# Patient Record
Sex: MALE | Race: WHITE | NOT HISPANIC OR LATINO | Employment: OTHER | URBAN - METROPOLITAN AREA
[De-identification: names, ages, dates, MRNs, and addresses within clinical notes are randomized per-mention and may not be internally consistent; named-entity substitution may affect disease eponyms.]

---

## 2017-03-16 PROBLEM — J33.9 SINUSITIS WITH NASAL POLYPS: Status: ACTIVE | Noted: 2017-03-16

## 2017-03-16 PROBLEM — J32.9 SINUSITIS WITH NASAL POLYPS: Status: ACTIVE | Noted: 2017-03-16

## 2017-09-01 ENCOUNTER — OFFICE VISIT (OUTPATIENT)
Dept: URGENT CARE | Facility: CLINIC | Age: 72
End: 2017-09-01
Payer: MEDICARE

## 2017-09-01 VITALS
RESPIRATION RATE: 19 BRPM | HEART RATE: 92 BPM | WEIGHT: 185 LBS | SYSTOLIC BLOOD PRESSURE: 143 MMHG | DIASTOLIC BLOOD PRESSURE: 83 MMHG | BODY MASS INDEX: 28.04 KG/M2 | OXYGEN SATURATION: 97 % | HEIGHT: 68 IN | TEMPERATURE: 99 F

## 2017-09-01 DIAGNOSIS — J40 BRONCHITIS: Primary | ICD-10-CM

## 2017-09-01 PROCEDURE — 1159F MED LIST DOCD IN RCRD: CPT | Mod: S$GLB,,, | Performed by: EMERGENCY MEDICINE

## 2017-09-01 PROCEDURE — 3008F BODY MASS INDEX DOCD: CPT | Mod: S$GLB,,, | Performed by: EMERGENCY MEDICINE

## 2017-09-01 PROCEDURE — 3079F DIAST BP 80-89 MM HG: CPT | Mod: S$GLB,,, | Performed by: EMERGENCY MEDICINE

## 2017-09-01 PROCEDURE — 1126F AMNT PAIN NOTED NONE PRSNT: CPT | Mod: S$GLB,,, | Performed by: EMERGENCY MEDICINE

## 2017-09-01 PROCEDURE — 99203 OFFICE O/P NEW LOW 30 MIN: CPT | Mod: S$GLB,,, | Performed by: EMERGENCY MEDICINE

## 2017-09-01 PROCEDURE — 3077F SYST BP >= 140 MM HG: CPT | Mod: S$GLB,,, | Performed by: EMERGENCY MEDICINE

## 2017-09-01 RX ORDER — DOXYCYCLINE 100 MG/1
100 CAPSULE ORAL 2 TIMES DAILY
Qty: 20 CAPSULE | Refills: 0 | Status: SHIPPED | OUTPATIENT
Start: 2017-09-01 | End: 2017-09-27

## 2017-09-01 NOTE — PATIENT INSTRUCTIONS
Bronchitis, Antibiotic Treatment (Adult)    Bronchitis is an infection of the air passages (bronchial tubes) in your lungs. It often occurs when you have a cold. This illness is contagious during the first few days and is spread through the air by coughing and sneezing, or by direct contact (touching the sick person and then touching your own eyes, nose, or mouth).  Symptoms of bronchitis include cough with mucus (phlegm) and low-grade fever. Bronchitis usually lasts 7 to 14 days. Mild cases can be treated with simple home remedies. More severe infection is treated with an antibiotic.  Home care  Follow these guidelines when caring for yourself at home:  · If your symptoms are severe, rest at home for the first 2 to 3 days. When you go back to your usual activities, don't let yourself get too tired.  · Do not smoke. Also avoid being exposed to secondhand smoke.  · You may use over-the-counter medicines to control fever or pain, unless another medicine was prescribed. (Note: If you have chronic liver or kidney disease or have ever had a stomach ulcer or gastrointestinal bleeding, talk with your healthcare provider before using these medicines. Also talk to your provider if you are taking medicine to prevent blood clots.) Aspirin should never be given to anyone younger than 18 years of age who is ill with a viral infection or fever. It may cause severe liver or brain damage.  · Your appetite may be poor, so a light diet is fine. Avoid dehydration by drinking 6 to 8 glasses of fluids per day (such as water, soft drinks, sports drinks, juices, tea, or soup). Extra fluids will help loosen secretions in the nose and lungs.  · Over-the-counter cough, cold, and sore-throat medicines will not shorten the length of the illness, but they may be helpful to reduce symptoms. (Note: Do not use decongestants if you have high blood pressure.)  · Finish all antibiotic medicine. Do this even if you are feeling better after only a  few days.  Follow-up care  Follow up with your healthcare provider, or as advised. If you had an X-ray or ECG (electrocardiogram), a specialist will review it. You will be notified of any new findings that may affect your care.  Note: If you are age 65 or older, or if you have a chronic lung disease or condition that affects your immune system, or you smoke, talk to your healthcare provider about having pneumococcal vaccinations and a yearly influenza vaccination (flu shot).  When to seek medical advice  Call your healthcare provider right away if any of these occur:  · Fever of 100.4°F (38°C) or higher  · Coughing up increased amounts of colored sputum  · Weakness, drowsiness, headache, facial pain, ear pain, or a stiff neck  Call 911, or get immediate medical care  Contact emergency services right away if any of these occur.  · Coughing up blood  · Worsening weakness, drowsiness, headache, or stiff neck  · Trouble breathing, wheezing, or pain with breathing  Date Last Reviewed: 9/13/2015 © 2000-2016 [employer]. [address] [place] [address], [place] [address]. All rights reserved. This information is not intended as a substitute for professional medical care. Always follow your healthcare professional's instructions.

## 2017-09-01 NOTE — PROGRESS NOTES
"Subjective:       Patient ID: Yoshi Mulligan is a 71 y.o. male.    Vitals:  height is 5' 8" (1.727 m) and weight is 83.9 kg (185 lb). His oral temperature is 99.3 °F (37.4 °C). His blood pressure is 143/83 (abnormal) and his pulse is 92. His respiration is 19 and oxygen saturation is 97%.     Chief Complaint: Cough    Pt with complicated medical problems including TIAs, atrial fib on anticoagulants and COPD. He was seen this week by the NP for Dr. Zavala and is scheduled to have many tests done and records from his other  Specialists. He is here today because he has chest congestion, low grade fever and dry cough. His leg swelling is better. He stopped smoking 4 yrs ago. He does have a sore throat too.    Discussed meds with pt. He can take doxy. He knows not to take decongestants due to his other medical problems. He does have an inhaler      Cough   This is a new problem. The current episode started in the past 7 days. The problem has been gradually worsening. The problem occurs constantly. The cough is non-productive. Associated symptoms include nasal congestion and a sore throat. Pertinent negatives include no chest pain, chills, ear pain, eye redness, fever, headaches, myalgias, postnasal drip, shortness of breath or wheezing. The symptoms are aggravated by lying down. Treatments tried: gaggle. The treatment provided mild relief. His past medical history is significant for bronchitis, COPD and emphysema. There is no history of asthma or pneumonia.     Review of Systems   Constitution: Negative for chills, fever and malaise/fatigue.   HENT: Positive for congestion and sore throat. Negative for ear pain, hoarse voice and postnasal drip.    Eyes: Negative for discharge and redness.   Cardiovascular: Negative for chest pain, dyspnea on exertion and leg swelling.   Respiratory: Positive for cough. Negative for shortness of breath, sputum production and wheezing.    Musculoskeletal: Negative for myalgias. "   Gastrointestinal: Negative for abdominal pain and nausea.   Neurological: Negative for headaches.       Objective:      Physical Exam   Constitutional: He is oriented to person, place, and time. He appears well-developed and well-nourished. He is cooperative.  Non-toxic appearance. He does not appear ill. He appears distressed (mild discomfort, obese and COPD habitus).   HENT:   Head: Normocephalic and atraumatic.   Right Ear: Hearing, tympanic membrane, external ear and ear canal normal.   Left Ear: Hearing, tympanic membrane, external ear and ear canal normal.   Nose: Nose normal. No mucosal edema, rhinorrhea or nasal deformity. No epistaxis. Right sinus exhibits no maxillary sinus tenderness and no frontal sinus tenderness. Left sinus exhibits no maxillary sinus tenderness and no frontal sinus tenderness.   Mouth/Throat: Uvula is midline and mucous membranes are normal. No trismus in the jaw. Normal dentition. No uvula swelling. Posterior oropharyngeal erythema present.   Eyes: Conjunctivae and lids are normal. No scleral icterus.   Sclera clear bilat   Neck: Trachea normal, full passive range of motion without pain and phonation normal. Neck supple.   Cardiovascular: Normal rate, intact distal pulses and normal pulses.  An irregularly irregular rhythm present.   Murmur heard.  Pulmonary/Chest: Effort normal. No respiratory distress. He has decreased breath sounds in the right lower field and the left lower field. He has wheezes in the left middle field and the left lower field. He has rhonchi in the left middle field and the left lower field.   Abdominal: Soft. Normal appearance and bowel sounds are normal. He exhibits no distension. There is no tenderness.   Musculoskeletal: Normal range of motion. He exhibits no edema or deformity.   Neurological: He is alert and oriented to person, place, and time. He exhibits normal muscle tone. Coordination normal.   Skin: Skin is warm, dry and intact. He is not  diaphoretic. No pallor.   Psychiatric: He has a normal mood and affect. His speech is normal and behavior is normal. Judgment and thought content normal. Cognition and memory are normal.   Nursing note and vitals reviewed.    CXR shows cardiomegaly and probable fat pad right side adjacent to diaphragm Increased interstitial markings but no consolidation Radiologist report confirms and pt informed  Assessment:       1. Bronchitis        Plan:         Bronchitis  -     X-Ray Chest PA And Lateral; Future; Expected date: 09/01/2017    Other orders  -     doxycycline (MONODOX) 100 MG capsule; Take 1 capsule (100 mg total) by mouth 2 (two) times daily.  Dispense: 20 capsule; Refill: 0

## 2017-09-27 ENCOUNTER — OFFICE VISIT (OUTPATIENT)
Dept: URGENT CARE | Facility: CLINIC | Age: 72
End: 2017-09-27
Payer: MEDICARE

## 2017-09-27 VITALS
OXYGEN SATURATION: 93 % | HEIGHT: 69 IN | BODY MASS INDEX: 27.4 KG/M2 | SYSTOLIC BLOOD PRESSURE: 198 MMHG | WEIGHT: 185 LBS | RESPIRATION RATE: 19 BRPM | TEMPERATURE: 99 F | HEART RATE: 132 BPM | DIASTOLIC BLOOD PRESSURE: 94 MMHG

## 2017-09-27 DIAGNOSIS — R00.0 TACHYCARDIA: ICD-10-CM

## 2017-09-27 DIAGNOSIS — R06.02 SHORTNESS OF BREATH: ICD-10-CM

## 2017-09-27 DIAGNOSIS — J44.1 COPD WITH ACUTE EXACERBATION: Primary | ICD-10-CM

## 2017-09-27 PROCEDURE — 3080F DIAST BP >= 90 MM HG: CPT | Mod: S$GLB,,, | Performed by: EMERGENCY MEDICINE

## 2017-09-27 PROCEDURE — 3008F BODY MASS INDEX DOCD: CPT | Mod: S$GLB,,, | Performed by: EMERGENCY MEDICINE

## 2017-09-27 PROCEDURE — 99214 OFFICE O/P EST MOD 30 MIN: CPT | Mod: S$GLB,,, | Performed by: EMERGENCY MEDICINE

## 2017-09-27 PROCEDURE — 1126F AMNT PAIN NOTED NONE PRSNT: CPT | Mod: S$GLB,,, | Performed by: EMERGENCY MEDICINE

## 2017-09-27 PROCEDURE — 1159F MED LIST DOCD IN RCRD: CPT | Mod: S$GLB,,, | Performed by: EMERGENCY MEDICINE

## 2017-09-27 PROCEDURE — 3077F SYST BP >= 140 MM HG: CPT | Mod: S$GLB,,, | Performed by: EMERGENCY MEDICINE

## 2017-09-27 NOTE — PATIENT INSTRUCTIONS
Go directly to the ER at [facility] now to be evaluated for your shortness of breath

## 2017-09-27 NOTE — PROGRESS NOTES
"Subjective:       Patient ID: Yoshi Mulligan is a 71 y.o. male.    Vitals:  height is 5' 9" (1.753 m) and weight is 83.9 kg (185 lb). His oral temperature is 99.1 °F (37.3 °C). His blood pressure is 198/94 (abnormal) and his pulse is 132 (abnormal). His respiration is 19 and oxygen saturation is 93% (abnormal).     Chief Complaint: Shortness of Breath    Patient with shortness of breath x 3 days. Patient with hx of asthma. Patient stating he was seen here on 09/01/17 and was given doxycycline. Medication not helpful so patient's pcp placed him on Ceftin and placed him on a Medrol dose pack. Patient reports improvement with those medications. On Sunday, patient stating he became short of breath. He went to pulmonary clinic rehab this am and got worse. His pcp Dr. Zavala the cardiologist sent him here instructed him to come here for a breathing treatment and a chest xray CBC, CMP and BNP.  Patient stating he has a hx of COPD. His normal 02 sat level is 96%. Pt's own 02 sat machine shows 96% right now.  Discussed with pt. I could get some of his tests but basically he needs more than we can get done here and CXR would just be repeated. He is a pt of Dr. Zavala at [facility] and I spoke with Dr. Christian Franz and notified him pt coming POV.      Cough   This is a new problem. The current episode started in the past 7 days. The problem has been unchanged. The problem occurs every few minutes. Associated symptoms include shortness of breath and wheezing. Pertinent negatives include no chest pain, chills, ear pain, eye redness, fever, headaches, myalgias, rash or sore throat. The symptoms are aggravated by lying down (exertion). He has tried steroid inhaler for the symptoms. The treatment provided mild relief. His past medical history is significant for asthma and COPD.     Review of Systems   Constitution: Positive for malaise/fatigue. Negative for chills and fever.   HENT: Positive for hoarse voice. Negative for " congestion, ear pain and sore throat.    Eyes: Negative for discharge and redness.   Cardiovascular: Positive for dyspnea on exertion. Negative for chest pain and leg swelling.   Respiratory: Positive for cough, shortness of breath and wheezing. Negative for sputum production.    Skin: Negative for rash.   Musculoskeletal: Negative for myalgias.   Gastrointestinal: Negative for abdominal pain and nausea.   Genitourinary: Negative for dysuria.   Neurological: Negative for headaches.   Psychiatric/Behavioral: Negative for altered mental status.       Objective:      Physical Exam   Constitutional: He is oriented to person, place, and time. He appears well-developed and well-nourished. He is cooperative.  Non-toxic appearance. He does not appear ill. He appears distressed (moderate resp distress).   HENT:   Head: Normocephalic and atraumatic.   Right Ear: Hearing, tympanic membrane, external ear and ear canal normal.   Left Ear: Hearing, tympanic membrane, external ear and ear canal normal.   Nose: Nose normal. No mucosal edema, rhinorrhea or nasal deformity. No epistaxis. Right sinus exhibits no maxillary sinus tenderness and no frontal sinus tenderness. Left sinus exhibits no maxillary sinus tenderness and no frontal sinus tenderness.   Mouth/Throat: Uvula is midline, oropharynx is clear and moist and mucous membranes are normal. No trismus in the jaw. Normal dentition. No uvula swelling. No posterior oropharyngeal erythema.   Eyes: Conjunctivae and lids are normal. Right eye exhibits no discharge. Left eye exhibits no discharge. No scleral icterus.   Sclera clear bilat   Neck: Trachea normal, normal range of motion, full passive range of motion without pain and phonation normal. Neck supple.   Cardiovascular: Regular rhythm, normal heart sounds and normal pulses.  Tachycardia present.    1+ pretibial edema bilaterally   Pulmonary/Chest: Tachypnea noted. He is in respiratory distress (pt talking in full sentences and  walks fine). He has decreased breath sounds.   Decrease BS diffusely. No rales or wheezes heard   Abdominal: Soft. Normal appearance and bowel sounds are normal. He exhibits no distension, no pulsatile midline mass and no mass. There is no tenderness.   Musculoskeletal: Normal range of motion. He exhibits edema. He exhibits no deformity.   Neurological: He is alert and oriented to person, place, and time. He exhibits normal muscle tone. Coordination normal.   Skin: Skin is warm, dry and intact. He is not diaphoretic. No pallor.   Psychiatric: He has a normal mood and affect. His speech is normal and behavior is normal. Judgment and thought content normal. Cognition and memory are normal.   Nursing note and vitals reviewed.      Assessment:       1. COPD with acute exacerbation    2. Tachycardia    3. Shortness of breath        Plan:         COPD with acute exacerbation    Tachycardia    Shortness of breath

## 2017-10-30 ENCOUNTER — OFFICE VISIT (OUTPATIENT)
Dept: URGENT CARE | Facility: CLINIC | Age: 72
End: 2017-10-30
Payer: MEDICARE

## 2017-10-30 VITALS
DIASTOLIC BLOOD PRESSURE: 84 MMHG | SYSTOLIC BLOOD PRESSURE: 142 MMHG | HEART RATE: 91 BPM | RESPIRATION RATE: 19 BRPM | OXYGEN SATURATION: 95 % | TEMPERATURE: 99 F | HEIGHT: 70 IN | WEIGHT: 185 LBS | BODY MASS INDEX: 26.48 KG/M2

## 2017-10-30 DIAGNOSIS — S92.502A FRACTURE OF FIFTH TOE, LEFT, CLOSED, INITIAL ENCOUNTER: Primary | ICD-10-CM

## 2017-10-30 DIAGNOSIS — S90.121A CONTUSION OF FIFTH TOE OF RIGHT FOOT, INITIAL ENCOUNTER: ICD-10-CM

## 2017-10-30 DIAGNOSIS — M79.671 PAIN IN RIGHT FOOT: ICD-10-CM

## 2017-10-30 PROCEDURE — 99203 OFFICE O/P NEW LOW 30 MIN: CPT | Mod: S$GLB,,, | Performed by: NURSE PRACTITIONER

## 2017-10-30 RX ORDER — METOPROLOL SUCCINATE 100 MG/1
100 TABLET, EXTENDED RELEASE ORAL DAILY
COMMUNITY
End: 2018-01-30

## 2017-10-30 NOTE — PROGRESS NOTES
"Subjective:       Patient ID: Yosih Mulligan is a 72 y.o. male.    Vitals:  height is 5' 10" (1.778 m) and weight is 83.9 kg (185 lb). His oral temperature is 98.8 °F (37.1 °C). His blood pressure is 142/84 (abnormal) and his pulse is 91. His respiration is 19 and oxygen saturation is 95%.     Chief Complaint: Foot Injury    Patient with rt foot pain x 2 days. Patient stating he woke up in the middle of the night on Saturday and he remembers falling. Patient stating he woke up on the ground. He presents with rt foot pain in addition to rt tib/fib pain. Patient stating he does not think he hit his head. Patient without neck or back pain.       Foot Injury    The incident occurred 2 days ago. The incident occurred at home. The injury mechanism was a fall. The pain is present in the right foot. The quality of the pain is described as aching. The pain is at a severity of 7/10. The pain is moderate. Pertinent negatives include no inability to bear weight or numbness. He reports no foreign bodies present. The symptoms are aggravated by movement. He has tried nothing for the symptoms.     Review of Systems   Constitution: Negative for weakness and malaise/fatigue.   HENT: Negative for nosebleeds.    Cardiovascular: Negative for chest pain and syncope.   Respiratory: Negative for shortness of breath.    Musculoskeletal: Positive for joint pain and joint swelling. Negative for back pain and neck pain.   Gastrointestinal: Negative for abdominal pain.   Genitourinary: Negative for hematuria.   Neurological: Negative for dizziness and numbness.       Objective:      Physical Exam   Constitutional: He is oriented to person, place, and time. He appears well-developed and well-nourished. He is cooperative.  Non-toxic appearance. He does not appear ill. No distress.   HENT:   Head: Normocephalic and atraumatic. Head is without abrasion, without contusion and without laceration.   Right Ear: Hearing, tympanic membrane, " external ear and ear canal normal. No hemotympanum.   Left Ear: Hearing, tympanic membrane, external ear and ear canal normal. No hemotympanum.   Nose: Nose normal. No mucosal edema, rhinorrhea or nasal deformity. No epistaxis. Right sinus exhibits no maxillary sinus tenderness and no frontal sinus tenderness. Left sinus exhibits no maxillary sinus tenderness and no frontal sinus tenderness.   Mouth/Throat: Uvula is midline, oropharynx is clear and moist and mucous membranes are normal. No trismus in the jaw. Normal dentition. No uvula swelling. No posterior oropharyngeal erythema.   Eyes: Conjunctivae, EOM and lids are normal. Pupils are equal, round, and reactive to light. Right eye exhibits no discharge. Left eye exhibits no discharge. No scleral icterus.   Sclera clear bilat   Neck: Trachea normal, normal range of motion, full passive range of motion without pain and phonation normal. Neck supple. No spinous process tenderness and no muscular tenderness present. No neck rigidity. No tracheal deviation present.   Cardiovascular: Normal rate, regular rhythm, normal heart sounds, intact distal pulses and normal pulses.    Pulmonary/Chest: Effort normal and breath sounds normal. No respiratory distress.   Abdominal: Soft. Normal appearance and bowel sounds are normal. He exhibits no distension, no pulsatile midline mass and no mass. There is no tenderness.   Musculoskeletal: Normal range of motion. He exhibits no edema or deformity.        Right foot: There is bony tenderness (5TH TOE) and swelling (AND ECCHYMOSIS).        Feet:    Neurological: He is alert and oriented to person, place, and time. He has normal strength. No cranial nerve deficit or sensory deficit. He exhibits normal muscle tone. He displays no seizure activity. Coordination normal. GCS eye subscore is 4. GCS verbal subscore is 5. GCS motor subscore is 6.   Skin: Skin is warm, dry and intact. Capillary refill takes less than 2 seconds. No abrasion,  no bruising, no burn, no ecchymosis and no laceration noted. He is not diaphoretic. No pallor.   Psychiatric: He has a normal mood and affect. His speech is normal and behavior is normal. Judgment and thought content normal. Cognition and memory are normal.   Nursing note and vitals reviewed.      X-Ray Foot Complete 3 view Right 10/30/2017 None Specified          RESULTS:  Comparison: None.     Findings: AP, lateral and oblique radiographs of the right foot as well as dedicated images of the right fifth ray demonstrate a comminuted fracture of the proximal metaphysis of the fifth proximal phalanx.  Minimal angulation identified.  No intra-articular extension.  Surrounding soft tissue swelling identified.  Remaining osseous structures, soft tissues and joint spaces are within normal limits.  IMPRESSION:    Comminuted fracture of the fifth proximal phalanx        Electronically signed by: CARYN SCHWARTZ MD  Date:                                            10/30/17  Time:                                           11:44            Assessment:       1. Fracture of fifth toe, left, closed, initial encounter    2. Contusion of fifth toe of right foot, initial encounter    3. Pain in right foot        Plan:         Fracture of fifth toe, left, closed, initial encounter    Contusion of fifth toe of right foot, initial encounter  -     X-Ray Toe 2 or More Views Right; Future; Expected date: 10/30/2017    Pain in right foot  -     X-Ray Foot Complete 3 view Right; Future; Expected date: 10/30/2017    4TH AND 5TH TOES RAGHAV TAPED    Please drink plenty of fluids.  Please get plenty of rest.  Please return here or go to the Emergency Department for any concerns or worsening of condition.  If you were prescribed a narcotic medication, do not drive or operate heavy equipment or machinery while taking these medications.  If you were not prescribed an anti-inflammatory medication, and if you do not have any history of  stomach/intestinal ulcers, or kidney disease, or are not taking a blood thinner such as Coumadin, Plavix, Pradaxa, Eloquis, or Xaralta for example, it is OK to take over the counter Ibuprofen or Advil or Motrin or Aleve as directed.  Do not take these medications on an empty stomach.  Rest, ice, compression and elevation to the affected joint or limb as needed.  Please follow up with your primary care doctor or specialist as needed.    If you  smoke, please stop smoking.

## 2017-10-30 NOTE — PATIENT INSTRUCTIONS
Closed Toe Fracture  Your toe is broken (fractured). This causes local pain, swelling, and sometimes bruising. This injury usually takes about 4 to 6 weeks to heal, but can sometimes take longer. Toe injuries are often treated by taping the injured toe to the next one (buddy taping). This protects the injured toe and holds it in position.     If the toenail has been severely injured, it may fall off in 1 to 2 weeks. It takes up to 12 months for a new toenail to grow back.  Home care  Follow these guidelines when caring for yourself at home:  · You may be given a cast shoe to wear to keep your toe from moving. If not, you can use a sandal or any shoe that doesnt put pressure on the injured toe until the swelling and pain go away. If using a sandal, be careful not to strike your foot against anything. Another injury could make the fracture worse. If you were given crutches, dont put full weight on the injured foot until you can do so without pain, or as directed by your healthcare provider.  · Keep your foot elevated to reduce pain and swelling. When sleeping, put a pillow under the injured leg. When sitting, support the injured leg so it is above your waist. This is very important during the first 2 days (48 hours).  · Put an ice pack on the injured area. Do this for 20 minutes every 1 to 2 hours the first day for pain relief. You can make an ice pack by wrapping a plastic bag of ice cubes in a thin towel. As the ice melts, be careful that any cloth or paper tape doesnt get wet. Continue using the ice pack 3 to 4 times a day for the next 2 days. Then use the ice pack as needed to ease pain and swelling.  · If buddy tape was used and it becomes wet or dirty, change it. You may replace it with paper, plastic, or cloth tape. Cloth tape and paper tapes must be kept dry.  · You may use acetaminophen or ibuprofen to control pain, unless another pain medicine was prescribed. If you have chronic liver or kidney disease,  talk with your healthcare provider before using these medicines. Also talk with your provider if youve had a stomach ulcer or gastrointestinal bleeding.  · You may return to sports or physical education activities after 4 weeks when you can run without pain, or as directed by your healthcare provider.  Follow-up care  Follow up with your healthcare provider in 1 week, or as advised. This is to make sure the bone is healing the way it should.  X-rays may be taken. You will be told of any new findings that may affect your care.  When to seek medical advice  Call your healthcare provider right away if any of these occur:  · Pain or swelling gets worse  · The cast/splint cracks  · The cast and padding get wet and stays wet more than 24 hours  · Bad odor from the cast/splint or wound fluid stains the cast  · Tightness or pressure under the cast/splint gets worse  · Toe becomes cold, blue, numb, or tingly  · You cant move the toe  · Signs of infection: fever, redness, warmth, swelling, or drainage from the wound or cast  · Fever of 100.4ºF (38ºC) or higher, or as directed by your healthcare provider  Date Last Reviewed: 2/1/2017  © 5201-6334 [employer]. [address] [place] [address], [place] [address]. All rights reserved. This information is not intended as a substitute for professional medical care. Always follow your healthcare professional's instructions.

## 2018-03-09 DIAGNOSIS — J44.9 CHRONIC OBSTRUCTIVE PULMONARY DISEASE, UNSPECIFIED COPD TYPE: Primary | ICD-10-CM

## 2018-03-15 ENCOUNTER — OFFICE VISIT (OUTPATIENT)
Dept: PULMONOLOGY | Facility: CLINIC | Age: 73
End: 2018-03-15
Payer: MEDICARE

## 2018-03-15 ENCOUNTER — HOSPITAL ENCOUNTER (OUTPATIENT)
Dept: PULMONOLOGY | Facility: CLINIC | Age: 73
Discharge: HOME | End: 2018-03-15
Payer: MEDICARE

## 2018-03-15 ENCOUNTER — PATIENT MESSAGE (OUTPATIENT)
Dept: PULMONOLOGY | Facility: CLINIC | Age: 73
End: 2018-03-15

## 2018-03-15 VITALS
WEIGHT: 186 LBS | DIASTOLIC BLOOD PRESSURE: 82 MMHG | HEART RATE: 72 BPM | BODY MASS INDEX: 26.63 KG/M2 | HEIGHT: 70 IN | SYSTOLIC BLOOD PRESSURE: 138 MMHG | OXYGEN SATURATION: 94 %

## 2018-03-15 DIAGNOSIS — J44.9 CHRONIC OBSTRUCTIVE PULMONARY DISEASE, UNSPECIFIED COPD TYPE: Primary | ICD-10-CM

## 2018-03-15 DIAGNOSIS — J44.9 CHRONIC OBSTRUCTIVE PULMONARY DISEASE, UNSPECIFIED COPD TYPE: ICD-10-CM

## 2018-03-15 DIAGNOSIS — Z87.891 FORMER SMOKER: ICD-10-CM

## 2018-03-15 DIAGNOSIS — Z12.9 SCREENING FOR CANCER: ICD-10-CM

## 2018-03-15 LAB
PRE FEV1 FVC: 51
PRE FEV1: 0.98
PRE FVC: 1.93
PREDICTED FEV1 FVC: 79
PREDICTED FEV1: 2.77
PREDICTED FVC: 3.48

## 2018-03-15 PROCEDURE — 99204 OFFICE O/P NEW MOD 45 MIN: CPT | Mod: 25,S$GLB,, | Performed by: INTERNAL MEDICINE

## 2018-03-15 PROCEDURE — 94729 DIFFUSING CAPACITY: CPT | Mod: S$GLB,,, | Performed by: INTERNAL MEDICINE

## 2018-03-15 PROCEDURE — 99999 PR PBB SHADOW E&M-EST. PATIENT-LVL IV: CPT | Mod: PBBFAC,,, | Performed by: INTERNAL MEDICINE

## 2018-03-15 PROCEDURE — 94010 BREATHING CAPACITY TEST: CPT | Mod: S$GLB,,, | Performed by: INTERNAL MEDICINE

## 2018-03-15 RX ORDER — ASPIRIN 81 MG/1
81 TABLET ORAL DAILY
COMMUNITY

## 2018-03-15 RX ORDER — TEMAZEPAM 30 MG/1
CAPSULE ORAL
Refills: 1 | COMMUNITY
Start: 2018-02-26 | End: 2018-04-25

## 2018-03-15 RX ORDER — ALBUTEROL SULFATE 0.83 MG/ML
SOLUTION RESPIRATORY (INHALATION)
Refills: 2 | COMMUNITY
Start: 2018-03-08

## 2018-03-15 RX ORDER — ALBUTEROL SULFATE 90 UG/1
2 AEROSOL, METERED RESPIRATORY (INHALATION) EVERY 6 HOURS PRN
Qty: 3 INHALER | Refills: 3 | Status: SHIPPED | OUTPATIENT
Start: 2018-03-15

## 2018-03-15 NOTE — PATIENT INSTRUCTIONS
Pulmonary  Medications  Medications play a key role in controlling your chronic respiratory  condition. Some help reduce chronic inflammation. Others are used to treat symptoms when they occur. This sheet will help you learn to use your medications the right way so you get the right kind of help. Always take your medications as prescribed. Know the names of your medications and how and when to use them.     Use your inhaler before brushing your teeth. This helps make rinsing your mouth afterward become automatic.        Quick-Relief Medications:  albuterol  Quick-relief (also called rescue) medications work by relaxing the muscles that tighten around the airways. This helps ease symptoms such as coughing, wheezing, and shortness of breath. Keep your quick-relief inhaler with you at all times--even if you feel okay. Quick-relief medications:   Are inhaled when needed.   Start to open the airways within a few minutes after you use them.   Can help stop a flare-up once it has begun.   Can help prevent flare-ups triggered by exercise.    Long-Term Control Medications: Trilogy Ellipta  Long-term control (also called maintenance or controller) medications help reduce swelling and inflammation of the airways. This makes the airways less sensitive to triggers and less likely to flare up. Long-term control medications:   Are taken on a schedule--for most people, every day. They are taken even when you feel fine.   Help keep symptoms under control so youre less likely to have symptoms.   Will NOT stop a flare-up once it has begun.      Using Inhaled Corticosteroids  Inhaled corticosteroids are safe for long-term use. They are not the steroids that you hear about athletes abusing. The usual prescribed doses of corticosteroids most often cause no side effects. Thats because theyre inhaled directly into the lungs, where theyre needed. So, they have little effect on the rest of the body. The chance of side  effects can be lowered even more if you:   Make sure you always use a spacer if using a metered dose inhaler.   Rinse your mouth, gargle, and spit out the water after using the inhaler.   Work with your health care provider to find the lowest dose that controls your pulmonary symptoms.   Tips for Taking Medications  Remembering to take medication each day can be hard for anyone. It can be even harder to remember when you dont have symptoms. Try these tips for keeping on track:   Develop a routine. For example, take long-term controllers as part of getting ready for bed.   Make sure you understand what long-term controllers do and dont do.   Refill your prescriptions on time, or even ahead of time, so you dont run out.   Carry your quick-relief medication with you. If you can, keep a spare quick-relief inhaler at work, at school, or in your gym bag.   When you travel, make sure you have enough medication to last for your entire trip.   When traveling by air, keep your medications with you, not packed in your luggage.   Make sure you know how to tell if your inhaler is empty. Ask your doctor or pharmacist, or check the instructions that come in the inhaler package for this information.  Working with Your Health Care Provider  By working with your health care provider, you can get the most benefit from your medications and reduce side effects. This helps ensure youre getting the best treatment. Dont make medication changes without talking to your health care provider. Issues to work on with your health care provider include:   Getting to the right dose. Over time, your health care provider may raise or lower the dose of controllers. The goal is to find the amount of medication to keep your pulmonary condition in control, without taking more than is needed.   Finding the right medications for you. Each person is unique. It may take a few tries to find the right medication or combination of medications  for you. If one medication doesnt work well for you, another may work better.   Minimizing side effects. If you have side effects, dont just stop taking your medication. Instead, call your health care provider. A new medication or dosage change may solve the problem--but you wont know unless you ask!     Using Inhalers  Some medications are taken using a device called an inhaler. The inhaler helps you take a measured dose of medication into your lungs. Not all inhalers work the same way. Have your health care provider show you how to use and care for the type of inhaler youre given.  Using Metered-Dose Inhalers (MDIs) with Spacers     Breathe in        Breathe out       Metered-dose inhalers use a fine spray to dispense medication. You may be asked to use a spacer (holding tube) with your inhaler. The spacer helps make sure all the medication you need goes to your lungs.  1. Remove the caps from the inhaler and spacer. Shake the inhaler well and attach the spacer. If the inhaler is being used for the first time or has not been used in a while, prime it as directed by its maker.  2. Breathe out normally. Put the spacer between your teeth and close your lips tightly around it. Keep your chin up.  3. Spray 1 puff into the spacer by pressing down on the inhaler. Then slowly breathe in as deeply as you can. This should take 3 to 5 seconds. (If you breathe too quickly, you may hear a whistling sound in the spacer.)  4. Take the spacer out of your mouth. Hold your breath for a count of 10 (if possible). Then slowly breathe out. If a second dose is prescribed, wait at least 30 seconds before taking the next puff.  Using MDIs Without Spacers  Inhalers work best with spacers. But if you dont have your spacer with you, these tips will help.  1. Shake the inhaler and remove the cap. Breathe out through your mouth.  2. Put the inhaler mouthpiece in your mouth and close your lips tightly around it. (Or, if told to do so by  your health care provider, hold the inhaler 1 to 2 inches from your mouth.)  3. Keep your chin up. Spray 1 puff by pressing down on the inhaler while breathing in deeply through your mouth for about 5 seconds. Hold your breath for a count of 10. Then breathe out slowly.  Using Dry-Powder Inhalers (DPIs)      Some inhalers use tiny grains of powder to dispense medication. These dont require spacers. They often have counters that track how many doses you use. Dry-powder inhalers dont all work the same way. Be sure you know how to use yours properly.  1. Load the prescribed dose of medication by following the instructions that come with the inhaler.  2. Breathe out normally, holding the inhaler away from your mouth. Hold your chin up.  3. Put the mouthpiece between your lips. Breathe in quickly and deeply through the inhaler--not through your nose. You may not feel or taste the medication as you breathe in. This is normal.  4. Take the mouthpiece out of your mouth. Hold your breath for a count of 10 (if possible).  5. Breathe out slowly--but not through the inhaler. Moisture from your breath can make the powder stick inside the inhaler. Also, be sure to close the inhaler and store it in a dry place.

## 2018-03-15 NOTE — PROGRESS NOTES
Subjective:       Patient ID: Yohsi Mulligan is a 72 y.o. male.    Chief Complaint: COPD    Mr. Mulligan is a 73 y/o male PMH HTN, DM2, COPD, paroxysmal atrial fibrillation, HFpEF who presents with chief complaint of dyspnea. He reports a 4 month history of dyspnea on exertion which has been gradually progressive and severe to the point he now is short of breath with minimal activity such as lifting his arms over his head or drying off with a towel. He denies any orthopnea, PND, nighttime symptoms but does report associated leg swelling L>R and wheezing. He states he saw his Cardiologist, Dr. Zavala, for these symptoms and was started on bumex which he reports only had mild improvement. In addition he has been on 2 courses of oral steroids and antibiotics (levofloxacin and amoxicillin) which improved the symptoms while he was on them but they returned a couple days after course finished. Last abx/steroid course was about 2 weeks ago per his report. He reports he has been using his rescue inhaler 4x per day approximately during this period.    For his COPD he was diagnosed 20 years ago, he reports he has been hospitalized 3 times (most recently 4 months ago) and has been in the ICU 2x. Followed by Dr. Lopes for last 4 years. He was intubated and on mechanical ventilation in 2014 for a COPD exacerbation. He is on Spiriva, combivent and albuterol and currently enrolled at [facility] rehab 3x/week. He reports he does not use spiriva every day due to an abnormal sensation from the powder and a feeling of thrush and occasional mouth pain. He reports his oxygen saturations at home are typically 92%, but that he believes saturations actually increase with exercise at rehab and home despite the dyspneic symptoms increasing. No personal history of blood clots but his daughter had a LE DVT previously. He reports his leg swelling was investigated with an US and was negative for clots during the admission.       Review of  Systems   Constitutional: Positive for activity change, fatigue and weakness. Negative for fever, chills and night sweats.   HENT: Negative for postnasal drip, rhinorrhea, sore throat and congestion.    Eyes: Negative for redness and itching.   Respiratory: Positive for cough, chest tightness, shortness of breath, previous hospitalization due to pulmonary problems, dyspnea on extertion and use of rescue inhaler. Negative for hemoptysis, sputum production, wheezing, orthopnea, asthma nighttime symptoms and Paroxysmal Nocturnal Dyspnea.    Cardiovascular: Positive for leg swelling. Negative for chest pain.   Genitourinary: Negative for difficulty urinating.   Endocrine: Negative for polydipsia and polyuria.    Musculoskeletal: Positive for gait problem. Negative for myalgias.   Skin: Negative for rash.   Gastrointestinal: Negative for nausea, vomiting, abdominal pain and acid reflux.   Neurological: Negative for syncope and weakness.   Hematological: Negative for adenopathy.   Psychiatric/Behavioral: Positive for sleep disturbance. Negative for confusion.       Objective:      Physical Exam   Constitutional: He appears well-developed. No distress.   HENT:   Head: Normocephalic.   Nose: Nose normal.   Mouth/Throat: No oropharyngeal exudate.   Neck: Normal range of motion. Neck supple. No JVD present.   Cardiovascular: Normal rate and intact distal pulses.  An irregularly irregular rhythm present.   Pulmonary/Chest: Hyperinflation and effort normal. No stridor. No tachypnea. No respiratory distress. He has decreased breath sounds in the right upper field, the right middle field, the right lower field, the left upper field, the left middle field and the left lower field. He has no wheezes. He has no rhonchi. He has no rales.   Abdominal: Soft. He exhibits no distension. There is no tenderness. There is no guarding.   Musculoskeletal: He exhibits edema (moderate CONNIE LE to mid calf). He exhibits no tenderness.  "  Lymphadenopathy:     He has no cervical adenopathy.   Neurological: He is alert. Gait abnormal.   Utilizes cane assistive device   Skin: Skin is warm and dry. Rash (CONNIE LE stastis changes) noted. He is not diaphoretic.   Psychiatric: He has a normal mood and affect. His behavior is normal.   Vitals reviewed.    Personal Diagnostic Review    No flowsheet data found.      Assessment:       No diagnosis found.    Outpatient Encounter Prescriptions as of 3/15/2018   Medication Sig Dispense Refill    ADVAIR DISKUS 250-50 mcg/dose diskus inhaler INL 2 PUFFS PO QD  11    albuterol (PROVENTIL) 2.5 mg /3 mL (0.083 %) nebulizer solution U 1 UNIT VIA NEB Q 4 TO 6 H PRF WHZ  2    aspirin (ECOTRIN) 81 MG EC tablet Take 81 mg by mouth once daily.      BD INSULIN PEN NEEDLE UF SHORT 31 gauge x 5/16" Ndle       COMBIVENT RESPIMAT  mcg/actuation inhaler INHALE 4 PUFFS  EVERY DAY AS NEEDED 12 g 3    cyanocobalamin (VITAMIN B-12) 1000 MCG tablet Take 1,000 mcg by mouth once daily.      ELIQUIS 5 mg Tab TAKE 1 TABLET TWICE DAILY 180 tablet 11    gabapentin (NEURONTIN) 300 MG capsule TAKE 1 CAPSULE BY MOUTH THREE TIMES DAILY 270 capsule 0    losartan (COZAAR) 100 MG tablet Take 1 tablet by mouth once daily at 6am.      metformin (GLUCOPHAGE) 500 MG tablet Take 500 mg by mouth 2 (two) times daily with meals.      metoprolol tartrate (LOPRESSOR) 25 MG tablet 50 mg in AM, 25 mg PM 90 tablet 5    NOVOLOG FLEXPEN 100 unit/mL InPn pen Inject 10 Units into the skin once daily. SM 4-8 UNITS SC TID B MEALS UTD PER SLIDING SCALE  11    potassium chloride SA (K-DUR,KLOR-CON) 20 MEQ tablet Take 1 tablet (20 mEq total) by mouth once daily. When taking furosemide 90 tablet 3    sotalol (BETAPACE) 80 MG tablet Take 0.5 tablets (40 mg total) by mouth 2 (two) times daily. 90 tablet 3    tamsulosin (FLOMAX) 0.4 mg Cp24 TAKE 1 CAPSULE DAILY 90 capsule 2    temazepam (RESTORIL) 30 mg capsule TK 1 C PO QD HS  1    GLENN JEAN" 300 unit/mL (1.5 mL) InPn pen Inject 70 Units into the skin once daily.      TRUE METRIX AIR GLUCOSE METER kit       TRUE METRIX GLUCOSE TEST STRIP Strp       TRUEPLUS LANCETS 28 gauge Misc       vitamin D 1000 units Tab Take 1,000 mg by mouth once daily.      bumetanide (BUMEX) 1 MG tablet Take 1 tablet (1 mg total) by mouth 2 (two) times daily. 60 tablet 5    doxycycline monohydrate 100 mg Tab TAKE 1 TABLET(100 MG) BY MOUTH TWICE DAILY 20 tablet 0    glimepiride (AMARYL) 2 MG tablet Take 1 tablet (2 mg total) by mouth daily with breakfast. (Patient taking differently: Take 2 mg by mouth 2 (two) times daily. ) 30 tablet 5    meloxicam (MOBIC) 15 MG tablet Take 1 tablet (15 mg total) by mouth once daily. 30 tablet 1    predniSONE (DELTASONE) 20 MG tablet 60 mg daily x 5 days, 40 mg daily x 5 days, 20 mg daily x 5 days, 10 mg daily x 5 days, then discontinue 33 tablet 0     No facility-administered encounter medications on file as of 3/15/2018.      No orders of the defined types were placed in this encounter.    Plan:       Mr. Mulligan is a 73 y/o male PMH HTN, DM2, COPD, paroxysmal atrial fibrillation, HFpEF who presents with COPD. FEV1 36% consistent with severe disease. Will change controller medication and encouraged adherence and to contact clinic if he has any trouble so that another regimen can be discussed. Offered low dose CT lung cancer screening given history which he stated he would like to pursue. Discussed possibility of pulm nodules and that they are a common finding and in the majority of cases not cancer but may require additional testing or monitoring which he stated he understood and wanted to proceed.     Chronic obstructive pulmonary disease, unspecified COPD type  -     Ambulatory Referral to Pulmonary Rehab, continues at [facility] and paying out of pocket  -     CT Chest Lung Screening Low Dose; Future; Expected date: 03/15/2018    Screening for cancer  -     CT Chest Lung Screening Low  Dose; Future; Expected date: 03/15/2018    Former smoker  -     CT Chest Lung Screening Low Dose; Future; Expected date: 03/15/2018    Other orders  -     Discontinue: fluticasone-umeclidin-vilanter (TRELEGY ELLIPTA) 100-62.5-25 mcg DsDv; Inhale 1 puff into the lungs once daily.  Dispense: 3 each; Refill: 3  -     albuterol 90 mcg/actuation inhaler; Inhale 2 puffs into the lungs every 6 (six) hours as needed for Wheezing. Rescue  Dispense: 3 Inhaler; Refill: 3  -     fluticasone-umeclidin-vilanter (TRELEGY ELLIPTA) 100-62.5-25 mcg DsDv; Inhale 1 puff into the lungs once daily.  Dispense: 1 each; Refill: 3      Patient seen and examined with Dr. Bruce:    STAFF ADDENDUM:  I have seen and examined the patient with the pulmonary resident, Dr Lam .  All recent investigations have been personally reviewed.  I agree with the assessment and plan as above.    Mr. Mulligan is a 72 year old gentleman with a past medical history of COPD.  He has frequent exacerbations requiring prednisone.  He is NOT adherent to daily controller medication of Advair due to side effects of thrush.  He uses combivent frequently.  He is here for a second opinion and consultation from Dr. Zavala.  He is adherent to [facility] pulmonary rehab.  Majority of visit was spent counseling Mr. Mulligan on the importance of adherence to COPD medications to prevent exacerbations and ways to prevent side effects such as thrush.  I have prescribed Trelegy which is a ICS/LABA/LAMA combination and has the convenience of once a day dosing.  He was advised to use it once every morning and to rinse after each use.  He was switched from combivent to albuterol secondary to cost and advised that the goal is not use as often once his symptoms become controlled.  He has no other symptoms that would trigger exacerbation such as post nasal drip and reflux.  He was also advised to continue diuretic prescribed by Dr. Zavala.  He was provided a coupon for trial of Trelegy,  given a Rx to continue pulmonary rehab at [facility] and will obtain a CT screen for lung cancer (advised of risks).  Will follow up in three months with divina and dlco performed prior.

## 2018-03-15 NOTE — LETTER
March 15, 2018        Young Zavala MD  [address] [facility] [id number]             [facility] - Pulmonary [facility]  [address]  [place] [address]  Phone: [phone]   Patient: Yoshi Mulligan   MR Number: [id number]   YOB: 1945   Date of Visit: 3/15/2018       Dear Young:    Thank you for referring Yoshi Mulligan to me for evaluation. Below are the relevant portions of my assessment and plan of care.        Briefly,      Mr. Mulligan is a 73 y/o male PMH HTN, DM2, COPD, paroxysmal atrial fibrillation, HFpEF who presents with COPD. FEV1 36% consistent with severe disease. Will change controller medication and encouraged adherence and to contact clinic if he has any trouble so that another regimen can be discussed. Offered low dose CT lung cancer screening given history which he stated he would like to pursue. Discussed possibility of pulm nodules and that they are a common finding and in the majority of cases not cancer but may require additional testing or monitoring which he stated he understood and wanted to proceed.     Chronic obstructive pulmonary disease, unspecified COPD type  -     Ambulatory Referral to Pulmonary Rehab, continues at [facility] and paying out of pocket  -     CT Chest Lung Screening Low Dose; Future; Expected date: 03/15/2018    Screening for cancer  -     CT Chest Lung Screening Low Dose; Future; Expected date: 03/15/2018    Former smoker  -     CT Chest Lung Screening Low Dose; Future; Expected date: 03/15/2018    Other orders  -     Discontinue: fluticasone-umeclidin-vilanter (TRELEGY ELLIPTA) 100-62.5-25 mcg DsDv; Inhale 1 puff into the lungs once daily.  Dispense: 3 each; Refill: 3  -     albuterol 90 mcg/actuation inhaler; Inhale 2 puffs into the lungs every 6 (six) hours as needed for Wheezing. Rescue  Dispense: 3 Inhaler; Refill: 3  -     fluticasone-umeclidin-vilanter (TRELEGY ELLIPTA) 100-62.5-25 mcg DsDv; Inhale 1 puff into the lungs once daily.   Dispense: 1 each; Refill: 3      Patient seen and examined with Dr. Bruce:    STAFF ADDENDUM:  I have seen and examined the patient with the pulmonary resident, Dr Lam .  All recent investigations have been personally reviewed.  I agree with the assessment and plan as above.    Mr. Mulligan is a 72 year old gentleman with a past medical history of COPD.  He has frequent exacerbations requiring prednisone.  He is NOT adherent to daily controller medication of Advair due to side effects of thrush.  He uses combivent frequently.  He is here for a second opinion and consultation from Dr. Zavala.  He is adherent to [facility] pulmonary rehab.  Majority of visit was spent counseling Mr. Mulligan on the importance of adherence to COPD medications to prevent exacerbations and ways to prevent side effects such as thrush.  I have prescribed Trelegy which is a ICS/LABA/LAMA combination and has the convenience of once a day dosing.  He was advised to use it once every morning and to rinse after each use.  He was switched from combivent to albuterol secondary to cost and advised that the goal is not use as often once his symptoms become controlled.  He has no other symptoms that would trigger exacerbation such as post nasal drip and reflux.  He was also advised to continue diuretic prescribed by Dr. Zavala.  He was provided a coupon for trial of Trelegy, given a Rx to continue pulmonary rehab at [facility] and will obtain a CT screen for lung cancer (advised of risks).  Will follow up in three months with divina and dlco performed prior.          If you have questions, please do not hesitate to call me. I look forward to following Yoshi along with you.    Sincerely,      Tiff Rangel MD           CC  No Recipients

## 2018-03-16 ENCOUNTER — PATIENT MESSAGE (OUTPATIENT)
Dept: PULMONOLOGY | Facility: CLINIC | Age: 73
End: 2018-03-16

## 2018-04-05 ENCOUNTER — HOSPITAL ENCOUNTER (OUTPATIENT)
Dept: RADIOLOGY | Facility: HOSPITAL | Age: 73
Discharge: HOME | End: 2018-04-05
Attending: INTERNAL MEDICINE
Payer: MEDICARE

## 2018-04-05 DIAGNOSIS — Z87.891 FORMER SMOKER: ICD-10-CM

## 2018-04-05 DIAGNOSIS — J44.9 CHRONIC OBSTRUCTIVE PULMONARY DISEASE, UNSPECIFIED COPD TYPE: ICD-10-CM

## 2018-04-05 DIAGNOSIS — Z12.9 SCREENING FOR CANCER: ICD-10-CM

## 2018-04-05 PROCEDURE — 76497 UNLISTED CT PROCEDURE: CPT | Mod: TC

## 2018-04-17 ENCOUNTER — TELEPHONE (OUTPATIENT)
Dept: OTOLARYNGOLOGY | Facility: CLINIC | Age: 73
End: 2018-04-17

## 2018-04-17 NOTE — TELEPHONE ENCOUNTER
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 For further evaluation  And treatment. Appointment slip mailed

## 2018-04-17 NOTE — TELEPHONE ENCOUNTER
----- Message from Shira Puentes sent at 4/13/2018  2:03 PM CDT -----  Contact: self  Pt stated that he has a new growth and needs to be seen asap.  He can be reached at [phone]

## 2018-04-25 ENCOUNTER — INITIAL CONSULT (OUTPATIENT)
Dept: OTOLARYNGOLOGY | Facility: CLINIC | Age: 73
End: 2018-04-25
Payer: MEDICARE

## 2018-04-25 VITALS
DIASTOLIC BLOOD PRESSURE: 73 MMHG | TEMPERATURE: 98 F | BODY MASS INDEX: 26.89 KG/M2 | HEART RATE: 69 BPM | WEIGHT: 187.38 LBS | SYSTOLIC BLOOD PRESSURE: 128 MMHG

## 2018-04-25 DIAGNOSIS — J44.9 CHRONIC OBSTRUCTIVE PULMONARY DISEASE, UNSPECIFIED COPD TYPE: ICD-10-CM

## 2018-04-25 DIAGNOSIS — D17.0 LIPOMA OF NECK: ICD-10-CM

## 2018-04-25 DIAGNOSIS — I48.0 PAROXYSMAL ATRIAL FIBRILLATION: Primary | ICD-10-CM

## 2018-04-25 PROCEDURE — 99204 OFFICE O/P NEW MOD 45 MIN: CPT | Mod: S$GLB,,, | Performed by: OTOLARYNGOLOGY

## 2018-04-25 PROCEDURE — 3074F SYST BP LT 130 MM HG: CPT | Mod: CPTII,S$GLB,, | Performed by: OTOLARYNGOLOGY

## 2018-04-25 PROCEDURE — 3078F DIAST BP <80 MM HG: CPT | Mod: CPTII,S$GLB,, | Performed by: OTOLARYNGOLOGY

## 2018-04-25 PROCEDURE — 99999 PR PBB SHADOW E&M-EST. PATIENT-LVL IV: CPT | Mod: PBBFAC,,, | Performed by: OTOLARYNGOLOGY

## 2018-04-25 NOTE — PROGRESS NOTES
"Head and Neck Surgery Consult    Seen in consultation from Dr. Costa    HPI: Yoshi Mulligan is a 72 y.o. male presenting with a R neck lipoma of several months' duration. He had a similar lipoma removed by Dr. 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 This current mass is not growing rapidly, is not painful, and he noticed incidentally one morning looking in the mirror. No facial weakness, numbness or other local symptoms. 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 He has COPD that he and his pulmonologist are working to gain better control, and has mild inspiratory stridor today.He is also on ASA and Eliquiis for a-fib and CAD.    Past Medical History:   Diagnosis Date    Colon cancer     Coronary artery disease     stent    Diabetes mellitus     Diastolic CHF     Hypertension     PAD (peripheral artery disease)     Paroxysmal atrial fibrillation        Past Surgical History:   Procedure Laterality Date    CORONARY ARTERY BYPASS GRAFT           Current Outpatient Prescriptions:     albuterol (PROVENTIL) 2.5 mg /3 mL (0.083 %) nebulizer solution, U 1 UNIT VIA NEB Q 4 TO 6 H PRF WHZ, Disp: , Rfl: 2    albuterol 90 mcg/actuation inhaler, Inhale 2 puffs into the lungs every 6 (six) hours as needed for Wheezing. Rescue, Disp: 3 Inhaler, Rfl: 3    aspirin (ECOTRIN) 81 MG EC tablet, Take 81 mg by mouth once daily., Disp: , Rfl:     BD INSULIN PEN NEEDLE UF SHORT 31 gauge x 5/16" Ndle, , Disp: , Rfl:     bumetanide (BUMEX) 1 MG tablet, Take 1 tablet (1 mg total) by mouth 2 (two) times daily., Disp: 60 tablet, Rfl: 5    cyanocobalamin (VITAMIN B-12) 1000 MCG tablet, Take 1,000 mcg by mouth once daily., Disp: , Rfl:     ELIQUIS 5 mg Tab, TAKE 1 TABLET TWICE DAILY, Disp: 180 tablet, Rfl: 11    fluticasone-umeclidin-vilanter (TRELEGY ELLIPTA) 100-62.5-25 mcg DsDv, Inhale 1 puff into the lungs once daily., Disp: 1 each, Rfl: 3    " gabapentin (NEURONTIN) 300 MG capsule, TAKE 1 CAPSULE BY MOUTH THREE TIMES DAILY, Disp: 270 capsule, Rfl: 0    glimepiride (AMARYL) 2 MG tablet, Take 1 tablet (2 mg total) by mouth daily with breakfast. (Patient taking differently: Take 2 mg by mouth 2 (two) times daily. ), Disp: 30 tablet, Rfl: 5    losartan (COZAAR) 100 MG tablet, Take 1 tablet by mouth once daily at 6am., Disp: , Rfl:     meloxicam (MOBIC) 15 MG tablet, Take 1 tablet (15 mg total) by mouth once daily., Disp: 30 tablet, Rfl: 1    metformin (GLUCOPHAGE) 500 MG tablet, Take 500 mg by mouth 2 (two) times daily with meals., Disp: , Rfl:     metoprolol tartrate (LOPRESSOR) 25 MG tablet, 50 mg in AM, 25 mg PM, Disp: 180 tablet, Rfl: 3    NOVOLOG FLEXPEN 100 unit/mL InPn pen, Inject 10 Units into the skin once daily. SM 4-8 UNITS SC TID B MEALS UTD PER SLIDING SCALE, Disp: , Rfl: 11    potassium chloride SA (K-DUR,KLOR-CON) 20 MEQ tablet, Take 1 tablet (20 mEq total) by mouth once daily. When taking furosemide, Disp: 90 tablet, Rfl: 3    sotalol (BETAPACE) 80 MG tablet, Take 0.5 tablets (40 mg total) by mouth 2 (two) times daily., Disp: 90 tablet, Rfl: 3    tamsulosin (FLOMAX) 0.4 mg Cp24, TAKE 1 CAPSULE DAILY, Disp: 90 capsule, Rfl: 2    temazepam (RESTORIL) 30 mg capsule, TK 1 C PO QD HS, Disp: , Rfl: 1    TOUJEO SOLOSTAR 300 unit/mL (1.5 mL) InPn pen, Inject 70 Units into the skin once daily., Disp: , Rfl:     TRUE METRIX AIR GLUCOSE METER kit, , Disp: , Rfl:     TRUE METRIX GLUCOSE TEST STRIP Strp, , Disp: , Rfl:     TRUEPLUS LANCETS 28 gauge Misc, , Disp: , Rfl:     vitamin D 1000 units Tab, Take 1,000 mg by mouth once daily., Disp: , Rfl:     Review of patient's allergies indicates:  No Known Allergies    No family history on file.    Social History     Social History    Marital status:      Spouse name: N/A    Number of children: N/A    Years of education: N/A     Occupational History    Not on file.     Social History  Main Topics    Smoking status: Former Smoker     Packs/day: 1.00     Years: 50.00     Types: Cigarettes     Quit date: 2014    Smokeless tobacco: Never Used    Alcohol use No    Drug use: No    Sexual activity: Not Currently     Other Topics Concern    Not on file     Social History Narrative    No narrative on file       Review of Systems -  Constitutional: Denies having night sweats, constant fatigue, loss of appetite or recent substantial weight loss.  Eyes: Denies blurred vision or double vision.  Respiratory: Denies symptoms of shortness of breath, noisy breathing, hoarseness or chronic cough.  GI: Denies symptoms of heartburn, acid regurgitation, or the known presence of a hiatal hernia.  The remainder of a 10-point review of systems is negative    REVIEW OF RADIOLOGICAL FILMS AND RECORDS (PERSONALLY REVIEWED):  Noncontributory    PHYSICAL EXAM:  Vitals - Wt 85 kg (187 lb 6.3 oz)   BMI 26.89 kg/m²   Constitutional -      General Appearance: well developed, well nourished, without obvious deformities     Communication: speaks with a normal voice without hoarseness  Head & Face -     Overall: no obvious scars, lesions or masses     Parotid and submandibular glands: no masses or tenderness     Facial strength: normal and equal bilaterally  Eyes -      EOM intact  Ear, Nose, Mouth & Throat -     Ears: both left and right external auditory canals and TM's are normal, no external deformities     Nasal exam: mucosa is pink, septum is midline, visible turbinates are normal on anterior rhinoscopy     Mastication: teeth appear present     Oral Cavity and oropharynx: mucosa, hard and soft palates, tongue, posterior pharyngeal wall, lips and gums are without lesions. Tonsils appear unseen  Respiratory:     Breathing unlabored  Larynx: using the mirror for indirect laryngoscopy, the epiglottic, false cords, true cords, and pyriform sinuses are without lesions and the true vocal cords move normally     Neck: No LAD.  There is a 2.5cm soft, mobile subcutaneous mass of level III/submental fat consistent with lipoma.     Thyroid: no asymmetry, thyromegaly, or thyroid nodules on palpation  Cranial Nerves:      II: Pupillary reflexes normal     III, IV, VI: EOM normal     V: 1,2,3: normal sensation     VII: Normal strength in all divisions     IX, X: Normal voice, palatal elevation and sensation     XI: Shoulder strength normal       XII: Tongue mobility normal  Psychiatric:     Appropriate affect    ASSESSMENT: lipoma    PLAN: Discussed possible excision if this becomes bothersome and to R/O malignant transformation, which I feel is very unlikely. For now he would prefer to get his COPD controlled somewhat better and then schedule excision of the lipoma. I feel this is a very good prioritization of his issues. He will also require cardiac clearance and an OK to be off of his anticoagulants for 1 week leading up to surgery.       Ross Noguera

## 2018-04-25 NOTE — LETTER
April 25, 2018      Clayton Costa MD  [address]  [facility] [address]           [facility]  [address]  [facility] [address]  Phone: [phone]  Fax: [phone]          Patient: Yoshi Mulligan   MR Number: [id number]   YOB: 1945   Date of Visit: 4/25/2018       Dear Dr. Clayton Costa:    Thank you for referring Yoshi Mulligan to me for evaluation. Attached you will find relevant portions of my assessment and plan of care.    If you have questions, please do not hesitate to call me. I look forward to following Yoshi Mulligan along with you.    Sincerely,    Ross Noguera MD    Enclosure  CC:  No Recipients    If you would like to receive this communication electronically, please contact [email or web address] or [phone] to request more information on [facility] access.    For providers and/or their staff who would like to refer a patient to [facility], please contact us through our one-stop-shop provider referral line, [facility], at [phone].    If you feel you have received this communication in error or would no longer like to receive these types of communications, please e-mail [email or web address]

## 2018-05-29 DIAGNOSIS — J44.9 CHRONIC OBSTRUCTIVE PULMONARY DISEASE, UNSPECIFIED COPD TYPE: Primary | ICD-10-CM

## 2018-06-04 ENCOUNTER — PATIENT MESSAGE (OUTPATIENT)
Dept: PULMONOLOGY | Facility: HOSPITAL | Age: 73
End: 2018-06-04

## 2018-06-04 DIAGNOSIS — R91.1 LUNG NODULE: ICD-10-CM

## 2018-06-04 DIAGNOSIS — R91.8 GROUND GLASS OPACITY PRESENT ON IMAGING OF LUNG: Primary | ICD-10-CM

## 2018-06-06 ENCOUNTER — TELEPHONE (OUTPATIENT)
Dept: PULMONOLOGY | Facility: CLINIC | Age: 73
End: 2018-06-06

## 2018-06-06 NOTE — TELEPHONE ENCOUNTER
I spoke to the pt to let him know there was an area that needs to be monitored on his last CT of chest and I scheduled prior to his clinic visit on 6/14/18 per Dr Rangel. Pt verbalized understanding. Also, I mail appointment to pt.

## 2018-06-10 ENCOUNTER — OFFICE VISIT (OUTPATIENT)
Dept: URGENT CARE | Facility: CLINIC | Age: 73
End: 2018-06-10
Payer: MEDICARE

## 2018-06-10 VITALS
TEMPERATURE: 99 F | SYSTOLIC BLOOD PRESSURE: 150 MMHG | OXYGEN SATURATION: 97 % | HEART RATE: 80 BPM | HEIGHT: 69 IN | WEIGHT: 182 LBS | RESPIRATION RATE: 20 BRPM | BODY MASS INDEX: 26.96 KG/M2 | DIASTOLIC BLOOD PRESSURE: 78 MMHG

## 2018-06-10 DIAGNOSIS — R11.0 NAUSEA: Primary | ICD-10-CM

## 2018-06-10 DIAGNOSIS — R10.9 ABDOMINAL PAIN, UNSPECIFIED ABDOMINAL LOCATION: ICD-10-CM

## 2018-06-10 LAB
BILIRUB UR QL STRIP: NEGATIVE
GLUCOSE UR QL STRIP: NEGATIVE
KETONES UR QL STRIP: NEGATIVE
LEUKOCYTE ESTERASE UR QL STRIP: NEGATIVE
PH, POC UA: 5 (ref 5–8)
POC BLOOD, URINE: NEGATIVE
POC NITRATES, URINE: NEGATIVE
PROT UR QL STRIP: POSITIVE
SP GR UR STRIP: 1.02 (ref 1–1.03)
UROBILINOGEN UR STRIP-ACNC: ABNORMAL (ref 0.3–2.2)

## 2018-06-10 PROCEDURE — 81003 URINALYSIS AUTO W/O SCOPE: CPT | Mod: QW,S$GLB,, | Performed by: NURSE PRACTITIONER

## 2018-06-10 PROCEDURE — 3077F SYST BP >= 140 MM HG: CPT | Mod: CPTII,S$GLB,, | Performed by: NURSE PRACTITIONER

## 2018-06-10 PROCEDURE — 3078F DIAST BP <80 MM HG: CPT | Mod: CPTII,S$GLB,, | Performed by: NURSE PRACTITIONER

## 2018-06-10 PROCEDURE — 99214 OFFICE O/P EST MOD 30 MIN: CPT | Mod: 25,S$GLB,, | Performed by: NURSE PRACTITIONER

## 2018-06-10 PROCEDURE — S0119 ONDANSETRON 4 MG: HCPCS | Mod: S$GLB,,, | Performed by: NURSE PRACTITIONER

## 2018-06-10 RX ORDER — ONDANSETRON 4 MG/1
4 TABLET, ORALLY DISINTEGRATING ORAL ONCE
Status: COMPLETED | OUTPATIENT
Start: 2018-06-10 | End: 2018-06-10

## 2018-06-10 RX ORDER — CLINDAMYCIN HYDROCHLORIDE 150 MG/1
CAPSULE ORAL
Refills: 0 | COMMUNITY
Start: 2018-06-01

## 2018-06-10 RX ORDER — ONDANSETRON 4 MG/1
4 TABLET, ORALLY DISINTEGRATING ORAL EVERY 6 HOURS PRN
Qty: 15 TABLET | Refills: 0 | Status: SHIPPED | OUTPATIENT
Start: 2018-06-10 | End: 2018-06-13

## 2018-06-10 RX ORDER — MOMETASONE FUROATE 1 MG/ML
SOLUTION TOPICAL
COMMUNITY
Start: 2018-04-23

## 2018-06-10 RX ADMIN — ONDANSETRON 4 MG: 4 TABLET, ORALLY DISINTEGRATING ORAL at 09:06

## 2018-06-10 NOTE — PROGRESS NOTES
"Subjective:       Patient ID: Yoshi Mulligan is a 72 y.o. male.    Vitals:  height is 5' 9" (1.753 m) and weight is 82.6 kg (182 lb). His oral temperature is 98.7 °F (37.1 °C). His blood pressure is 150/78 (abnormal) and his pulse is 80. His respiration is 20 and oxygen saturation is 97%.     Chief Complaint: Nausea    Pt presents to clinic with c/o chronic cough (no change from baseline) and excess mucus causing nausea. Pt states he just had a CXR last week and has a follow up appt with PCP on Thursday, just needs nausea meds until then. Denies abd pain, states nausea started last night while lying down.     Pt currently taking Clindamycin for LLE cellulitis.  this morning per pt, states he has been unable to eat due to nausea. Pt and daughter state infection to LLE has much improved. No glucose in UA. Does not wish to have elevated BS looked into at this time.       Nausea   This is a new problem. The current episode started yesterday. The problem occurs constantly. The problem has been unchanged. Pertinent negatives include no chest pain or chills. The symptoms are aggravated by coughing and eating. He has tried nothing for the symptoms. The treatment provided no relief.     Review of Systems   Constitution: Negative for chills.   Cardiovascular: Negative for chest pain.   Respiratory: Negative for shortness of breath.    Musculoskeletal: Negative for back pain.   All other systems reviewed and are negative.      Objective:      Physical Exam   Constitutional: He is oriented to person, place, and time. He appears well-developed and well-nourished. He is cooperative.  Non-toxic appearance. He does not appear ill. No distress.   HENT:   Head: Normocephalic and atraumatic.   Right Ear: Hearing, tympanic membrane, external ear and ear canal normal.   Left Ear: Hearing, tympanic membrane, external ear and ear canal normal.   Nose: Nose normal. No mucosal edema, rhinorrhea or nasal deformity. No " epistaxis. Right sinus exhibits no maxillary sinus tenderness and no frontal sinus tenderness. Left sinus exhibits no maxillary sinus tenderness and no frontal sinus tenderness.   Mouth/Throat: Uvula is midline, oropharynx is clear and moist and mucous membranes are normal. No trismus in the jaw. Normal dentition. No uvula swelling. No posterior oropharyngeal erythema.   Eyes: Conjunctivae and lids are normal. Right eye exhibits no discharge. Left eye exhibits no discharge. No scleral icterus.   Sclera clear bilat   Neck: Trachea normal, normal range of motion, full passive range of motion without pain and phonation normal. Neck supple.   Cardiovascular: Normal rate, regular rhythm, normal heart sounds, intact distal pulses and normal pulses.    Pulmonary/Chest: Effort normal. No respiratory distress. He has wheezes (diffuse inspiratory). He has rhonchi.   Abdominal: Soft. Normal appearance and bowel sounds are normal. He exhibits no distension, no pulsatile midline mass and no mass. There is no tenderness.   Musculoskeletal: Normal range of motion. He exhibits no edema or deformity.        Left lower leg: He exhibits no tenderness, no bony tenderness, no swelling, no edema, no deformity and no laceration.        Legs:  Neurological: He is alert and oriented to person, place, and time. He exhibits normal muscle tone. Coordination normal.   Skin: Skin is warm, dry and intact. He is not diaphoretic. No pallor.   Psychiatric: He has a normal mood and affect. His speech is normal and behavior is normal. Judgment and thought content normal. Cognition and memory are normal.   Nursing note and vitals reviewed.      Assessment:       1. Nausea    2. Abdominal pain, unspecified abdominal location        Plan:     Pt has follow up appt with PCP on Thursday.    Nausea  -     ondansetron (ZOFRAN-ODT) 4 MG TbDL; Take 1 tablet (4 mg total) by mouth every 6 (six) hours as needed (nausea vomiting).  Dispense: 15 tablet; Refill:  0  -     ondansetron disintegrating tablet 4 mg; Take 1 tablet (4 mg total) by mouth once.    Abdominal pain, unspecified abdominal location  -     POCT Urinalysis, Dipstick, Automated, W/O Scope

## 2018-06-10 NOTE — PATIENT INSTRUCTIONS
Deep Coughing    Deep coughing helps keep your lungs clear. If youve had surgery, this will help you get better faster. Deep coughing also helps you breathe easier and may prevent a lung infection. Follow these steps to do deep coughing.  Step 1  · Sit on the edge of a bed or a chair. You can also lie on your back with your knees slightly bent.  · Lean forward slightly.  · If you've had surgery on your chest or stomach, hold a pillow or rolled-up towel firmly against your incision with both hands. The concept is to hug the pillow.  · Breathe out normally.  Step 2  · Breathe in slowly and deeply through your nose.  · Then breathe out fully through your mouth. Repeat this breathing in and out a second time.  · For the third time, take a slow, deep breath through your nose. Fill your lungs with as much air as you can.  Step 3  · Cough 2 or 3 times in a row. Try to push all of the air out of your lungs as you cough.  · Relax and breathe normally.  · Repeat the above steps as directed.  Follow-up care  Make a follow-up appointment as directed by our staff.  When to call your healthcare provider  Call your healthcare provider right away if you have any of the following:  · Fever of 100.4°F (38°C) or higher, or as directed by your healthcare provider  · Signs of infection, if you've had surgery. These include redness, swelling, drainage, or warmth at your incision site, or pus or fluid draining from the site  · Brownish, white, or bloody sputum  · Nausea or vomiting  · Increasing pain  · Dizziness or weakness  · Fast or irregular heartbeat  Call 911  Shortness of breath may be a sign of a serious health problem. Call 911 if you have shortness of breath that gets worse or have trouble breathing, especially with any of the symptoms below:  · Confusion or trouble staying awake  · Loss of consciousness or fainting  · Chest pain or tightness  · Trouble breathing or wheezing  · Bluish skin  · Coughing up blood  · Severe pain    Date Last Reviewed: 1/1/2017  © 8605-2620 [employer]. [address] [place] [address], [place] [address]. All rights reserved. This information is not intended as a substitute for professional medical care. Always follow your healthcare professional's instructions.    Please arrange follow up with your primary medical clinic as soon as possible. You must understand that you've received an Urgent Care treatment only and that you may be released before all of your medical problems are known or treated. You, the patient, will arrange for follow up as instructed. If your symptoms worsen or fail to improve you should go to the Emergency Room.